# Patient Record
Sex: FEMALE | Race: ASIAN | NOT HISPANIC OR LATINO | Employment: UNEMPLOYED | URBAN - METROPOLITAN AREA
[De-identification: names, ages, dates, MRNs, and addresses within clinical notes are randomized per-mention and may not be internally consistent; named-entity substitution may affect disease eponyms.]

---
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
## 2024-03-11 ENCOUNTER — NURSE TRIAGE (OUTPATIENT)
Dept: FAMILY MEDICINE | Facility: CLINIC | Age: 24
End: 2024-03-11

## 2024-03-11 NOTE — TELEPHONE ENCOUNTER
"Patient called to schedule visit for spot on her right shin.     She does not have primary care provider she sees.     She notes she has a 1/2\" diameter area on her right shin that is itchy, red, and dry. She thought it was eczema and has been applying desonide 0.05% 2 x / day but it has not helped.     She has had it for 2-3 weeks.     She does not have a dermatologist she has seen in the past.     She denies signs of infection.     RN scheduled her with Jalen Aleman 3/29/24 to establish care. Advised patient to go to urgent care if it gets worse, or if any signs of infection. She agrees with plan and denies further questions at this time.     Erin Pan, RN, BSN, PHN  [facility]  Nurse Triage, [facility]    Reason for Disposition   Caller is uncertain what lesion is    Additional Information   Negative: Patient sounds very sick or weak to the triager   Negative: Fever and bump is tender to touch   Negative: Looks infected (e.g., spreading redness, red streak, pus)   Negative: Looks like a boil, infected sore, or deep ulcer   Negative: Caller can't describe it clearly   Negative: Flat waxy-yellow patch near eyelids   Negative: Scar that is growing larger   Negative: Skin growth or mole and it is larger than a pencil eraser or increasing in size   Negative: Skin growth or mole and bleeds   Negative: Sticks up out of the skin (elevated), and feels rough to the touch   Negative: Skin growth or mole and border is irregular or blurry   Negative: Skin growth or mole and changes color or has more than one color   Negative: Patient wants to be seen   Negative: Skin growth or mole and two sides do not look the same (it is asymmetric)    Protocols used: Skin Lesion - Moles or Growths-A-OH    "

## 2024-04-19 ENCOUNTER — OFFICE VISIT (OUTPATIENT)
Dept: FAMILY MEDICINE | Facility: CLINIC | Age: 24
End: 2024-04-19
Payer: COMMERCIAL

## 2024-04-19 VITALS
OXYGEN SATURATION: 99 % | WEIGHT: 114.8 LBS | SYSTOLIC BLOOD PRESSURE: 117 MMHG | HEART RATE: 85 BPM | HEIGHT: 63 IN | RESPIRATION RATE: 16 BRPM | TEMPERATURE: 98.6 F | BODY MASS INDEX: 20.34 KG/M2 | DIASTOLIC BLOOD PRESSURE: 76 MMHG

## 2024-04-19 DIAGNOSIS — R21 RASH: Primary | ICD-10-CM

## 2024-04-19 PROCEDURE — 99203 OFFICE O/P NEW LOW 30 MIN: CPT | Performed by: PREVENTIVE MEDICINE

## 2024-04-19 RX ORDER — TRIAMCINOLONE ACETONIDE 5 MG/G
OINTMENT TOPICAL
Qty: 30 G | Refills: 0 | Status: SHIPPED | OUTPATIENT
Start: 2024-04-19

## 2024-04-19 ASSESSMENT — PAIN SCALES - GENERAL: PAINLEVEL: NO PAIN (0)

## 2024-04-19 NOTE — PATIENT INSTRUCTIONS
At [facility], we strive to deliver an exceptional experience to you, every time we see you. If you receive a survey, please complete it as we do value your feedback.  If you have MyChart, you can expect to receive results automatically within 24 hours of their completion.  Your provider will send a note interpreting your results as well.   If you do not have MyChart, you should receive your results in about a week by mail.    Your care team:                            Family Medicine Internal Medicine   MD Arnold Marina, MD Jen Gifford, MD Roxanna Daly, PA-C    Martin Cason, MD Pediatrics   Rob Gonzalez, PA-C  Chanda Gaines, MD Louisa Osorio APRLORENZO Ohara CNP, CNP, MD Gwen Hart, MD Stephanie Polanco, CNP  Same-Day (No follow up visit)   Jalen Aleman, SO Montoya, PA-C    Mary Carmen Yin PA-C     Clinic hours: Monday - Thursday 7 am-6 pm; Fridays 7 am-5 pm.   Urgent care: Monday - Friday 10 am- 8 pm; Saturday and Sunday 9 am-5 pm.    Clinic: [phone]       [facility]: Monday - Thursday 8 am - 7 pm; Friday 8 am - 6 pm  [facility]: [phone]

## 2024-04-19 NOTE — PROGRESS NOTES
"  Assessment & Plan     Rash  -differentials considered include eczema, tinea corporis  -Will start with topical steroid  -Use of emollients reviewed  -Referral to dermatology placed, patient to cancel dermatology appointment if rash improves.  -Due for a preventive visit  - triamcinolone (KENALOG) 0.5 % external ointment  Dispense: 30 g; Refill: 0  - Adult Dermatology  Referral      Prescription drug management  10 minutes spent by me on the date of the encounter doing chart review, history and exam, documentation and further activities per the note      Subjective   Jennifer is a 23 year old, presenting for the following health issues:  [facility]      4/19/2024     2:36 PM   Additional Questions   Roomed by chandra     History of Present Illness       Reason for visit:  Rash on leg    She eats 4 or more servings of fruits and vegetables daily.She consumes 0 sweetened beverage(s) daily.She exercises with enough effort to increase her heart rate 10 to 19 minutes per day.  She exercises with enough effort to increase her heart rate 3 or less days per week.   She is taking medications regularly.       Rash  Onset/Duration: rash started beginning of the gabi   Description  Location: right leg  Character: blotchy, raised, itchy+   Itching: mild  Intensity:  moderate  Progression of Symptoms:  improving  Accompanying signs and symptoms:   Fever: No  Body aches or joint pain: No  Sore throat symptoms: No  Recent cold symptoms: No  History:           Previous episodes of similar rash: None  New exposures:  None  Recent travel: No  Exposure to similar rash: No  Precipitating or alleviating factors: NA  Therapies tried and outcome: Desowen    She notes she has a 1/2\" diameter area on her right shin that is itchy, red, and dry. She thought it was eczema and has been applying desonide 0.05% 2 x / day but it has not helped.         She does not have a dermatologist she has seen in the past.      She denies signs of " infection.   No family members with a rash  No fever   No new joint pains  No changes in soaps or detergents  Showed a picture of the rash from a month ago and it is a little better.   No trauma  No insect bites           Objective    There were no vitals taken for this visit.  There is no height or weight on file to calculate BMI.  Physical Exam   GENERAL APPEARANCE: healthy, alert and no distress  EYES: Eyes grossly normal to inspection and conjunctivae and sclerae normal  RESP: lungs clear to auscultation - no rales, rhonchi or wheezes  CV: regular rates and rhythm, normal S1 S2, no S3 or S4 and no murmur, click or rub  SKIN: no suspicious lesions or rashes  NEURO: Normal strength and tone, mentation intact and speech normal  PSYCH: mentation appears normal  Right lower leg laterally: circular patch+ slightly hyperkeratotic, scaly, no raised margins, no central clearing, no drainage, no tenderness, no fluctuance or induration.       No results found for this or any previous visit (from the past 24 hour(s)).        Signed Electronically by: Chanda Gaines MD MPH

## 2024-05-29 ENCOUNTER — ANCILLARY PROCEDURE (OUTPATIENT)
Dept: GENERAL RADIOLOGY | Facility: CLINIC | Age: 24
End: 2024-05-29
Attending: FAMILY MEDICINE
Payer: COMMERCIAL

## 2024-05-29 ENCOUNTER — OFFICE VISIT (OUTPATIENT)
Dept: URGENT CARE | Facility: URGENT CARE | Age: 24
End: 2024-05-29
Payer: COMMERCIAL

## 2024-05-29 VITALS
SYSTOLIC BLOOD PRESSURE: 114 MMHG | TEMPERATURE: 97.4 F | OXYGEN SATURATION: 96 % | WEIGHT: 115.6 LBS | HEART RATE: 74 BPM | DIASTOLIC BLOOD PRESSURE: 74 MMHG | RESPIRATION RATE: 18 BRPM | BODY MASS INDEX: 20.48 KG/M2

## 2024-05-29 DIAGNOSIS — J40 BRONCHITIS: Primary | ICD-10-CM

## 2024-05-29 DIAGNOSIS — J30.81 ALLERGIC RHINITIS DUE TO ANIMALS: ICD-10-CM

## 2024-05-29 DIAGNOSIS — J98.8 RESPIRATORY INFECTION: ICD-10-CM

## 2024-05-29 PROCEDURE — 99213 OFFICE O/P EST LOW 20 MIN: CPT | Performed by: FAMILY MEDICINE

## 2024-05-29 PROCEDURE — 71046 X-RAY EXAM CHEST 2 VIEWS: CPT | Mod: TC | Performed by: RADIOLOGY

## 2024-05-29 RX ORDER — FLUTICASONE PROPIONATE 50 MCG
1 SPRAY, SUSPENSION (ML) NASAL 2 TIMES DAILY
Qty: 18.2 ML | Refills: 2 | Status: SHIPPED | OUTPATIENT
Start: 2024-05-29

## 2024-05-29 RX ORDER — AZITHROMYCIN 250 MG/1
TABLET, FILM COATED ORAL
Qty: 6 TABLET | Refills: 0 | Status: SHIPPED | OUTPATIENT
Start: 2024-05-29 | End: 2024-06-03

## 2024-05-29 NOTE — PROGRESS NOTES
(J40) Bronchitis  (primary encounter diagnosis)  Comment:   Plan: azithromycin (ZITHROMAX) 250 MG tablet            (J98.8) Respiratory infection  Comment:   Plan: XR Chest 2 Views            (J30.81) Allergic rhinitis due to animals  Comment:   Plan: fluticasone (FLONASE) 50 MCG/ACT nasal spray            CHIEF COMPLAINT    Congestion and cough.      HISTORY    She has a cough for 2 to 3 days.  Her brother had pneumonia and she is concerned.  She is not currently having fever.  Chest hurts when she coughs.  Feels kind of wheezy.    She also has some allergic rhinitis symptoms.  Takes Zyrtec.    Non-smoker.  Does not vape.  Has no history of asthma.      REVIEW OF SYSTEMS    No sore throat or ear pain.  No nausea or abdominal pain.  No rash.  No swelling.      EXAM  /74 (BP Location: Left arm, Patient Position: Sitting, Cuff Size: Adult Regular)   Pulse 74   Temp 97.4  F (36.3  C) (Oral)   Resp 18   Wt 52.4 kg (115 lb 9.6 oz)   LMP 05/15/2024 (Approximate)   SpO2 96%   BMI 20.48 kg/m      She appears well in general.  TMs and pharynx WNL.  Neck negative.  Chest clear.      Results for orders placed or performed in visit on 05/29/24   XR Chest 2 Views     Status: None    Narrative    XR CHEST 2 VIEWS   5/29/2024 2:32 PM     HISTORY: cough and chest ache; Respiratory infection    COMPARISON: None.      Impression    IMPRESSION: No acute cardiopulmonary disease.    RYAN BLACK MD         SYSTEM ID:  [id number]